# Patient Record
Sex: FEMALE | Race: BLACK OR AFRICAN AMERICAN | NOT HISPANIC OR LATINO | Employment: UNEMPLOYED | URBAN - METROPOLITAN AREA
[De-identification: names, ages, dates, MRNs, and addresses within clinical notes are randomized per-mention and may not be internally consistent; named-entity substitution may affect disease eponyms.]

---

## 2024-04-14 ENCOUNTER — HOSPITAL ENCOUNTER (OUTPATIENT)
Dept: RADIOLOGY | Facility: CLINIC | Age: 44
Discharge: HOME | End: 2024-04-14
Payer: COMMERCIAL

## 2024-04-14 DIAGNOSIS — E78.5 HYPERLIPIDEMIA: ICD-10-CM

## 2024-04-14 PROCEDURE — 75571 CT HRT W/O DYE W/CA TEST: CPT

## 2025-05-20 ENCOUNTER — APPOINTMENT (OUTPATIENT)
Dept: OBSTETRICS AND GYNECOLOGY | Facility: CLINIC | Age: 45
End: 2025-05-20
Payer: COMMERCIAL

## 2025-05-20 VITALS
DIASTOLIC BLOOD PRESSURE: 76 MMHG | HEIGHT: 71 IN | BODY MASS INDEX: 32.62 KG/M2 | WEIGHT: 233 LBS | SYSTOLIC BLOOD PRESSURE: 113 MMHG

## 2025-05-20 DIAGNOSIS — Z12.31 ENCOUNTER FOR SCREENING MAMMOGRAM FOR MALIGNANT NEOPLASM OF BREAST: Primary | ICD-10-CM

## 2025-05-20 DIAGNOSIS — F32.A DEPRESSION, UNSPECIFIED DEPRESSION TYPE: ICD-10-CM

## 2025-05-20 DIAGNOSIS — Z80.0 FAMILY HISTORY OF COLON CANCER: ICD-10-CM

## 2025-05-20 DIAGNOSIS — Z71.6 TOBACCO ABUSE COUNSELING: ICD-10-CM

## 2025-05-20 DIAGNOSIS — Z01.419 WELL WOMAN EXAM WITH ROUTINE GYNECOLOGICAL EXAM: ICD-10-CM

## 2025-05-20 DIAGNOSIS — N92.4 EXCESSIVE BLEEDING IN PREMENOPAUSAL PERIOD: ICD-10-CM

## 2025-05-20 DIAGNOSIS — Z12.4 CERVICAL CANCER SCREENING: ICD-10-CM

## 2025-05-20 DIAGNOSIS — Z20.2 POSSIBLE EXPOSURE TO STD: ICD-10-CM

## 2025-05-20 DIAGNOSIS — N76.0 BACTERIAL VAGINOSIS: ICD-10-CM

## 2025-05-20 DIAGNOSIS — B96.89 BACTERIAL VAGINOSIS: ICD-10-CM

## 2025-05-20 DIAGNOSIS — N89.8 VAGINAL DISCHARGE: ICD-10-CM

## 2025-05-20 DIAGNOSIS — Z86.19 HISTORY OF HERPES GENITALIS: ICD-10-CM

## 2025-05-20 PROCEDURE — 88175 CYTOPATH C/V AUTO FLUID REDO: CPT

## 2025-05-20 PROCEDURE — 1036F TOBACCO NON-USER: CPT | Performed by: ADVANCED PRACTICE MIDWIFE

## 2025-05-20 PROCEDURE — 87661 TRICHOMONAS VAGINALIS AMPLIF: CPT

## 2025-05-20 PROCEDURE — 99386 PREV VISIT NEW AGE 40-64: CPT | Performed by: ADVANCED PRACTICE MIDWIFE

## 2025-05-20 PROCEDURE — 87491 CHLMYD TRACH DNA AMP PROBE: CPT

## 2025-05-20 PROCEDURE — 87591 N.GONORRHOEAE DNA AMP PROB: CPT

## 2025-05-20 PROCEDURE — 3008F BODY MASS INDEX DOCD: CPT | Performed by: ADVANCED PRACTICE MIDWIFE

## 2025-05-20 PROCEDURE — 87626 HPV SEP HI-RSK TYP&POOL RSLT: CPT

## 2025-05-20 RX ORDER — FLUOXETINE 10 MG/1
10 CAPSULE ORAL DAILY
Qty: 30 CAPSULE | Refills: 11 | Status: SHIPPED | OUTPATIENT
Start: 2025-05-20 | End: 2026-05-20

## 2025-05-20 RX ORDER — VALACYCLOVIR HYDROCHLORIDE 1 G/1
1000 TABLET, FILM COATED ORAL 2 TIMES DAILY
Qty: 20 TABLET | Refills: 3 | Status: SHIPPED | OUTPATIENT
Start: 2025-05-20 | End: 2025-06-29

## 2025-05-20 SDOH — ECONOMIC STABILITY: FOOD INSECURITY: WITHIN THE PAST 12 MONTHS, THE FOOD YOU BOUGHT JUST DIDN'T LAST AND YOU DIDN'T HAVE MONEY TO GET MORE.: SOMETIMES TRUE

## 2025-05-20 SDOH — ECONOMIC STABILITY: FOOD INSECURITY: WITHIN THE PAST 12 MONTHS, YOU WORRIED THAT YOUR FOOD WOULD RUN OUT BEFORE YOU GOT MONEY TO BUY MORE.: SOMETIMES TRUE

## 2025-05-20 SDOH — ECONOMIC STABILITY: INCOME INSECURITY: IN THE LAST 12 MONTHS, WAS THERE A TIME WHEN YOU WERE NOT ABLE TO PAY THE MORTGAGE OR RENT ON TIME?: YES

## 2025-05-20 SDOH — ECONOMIC STABILITY: TRANSPORTATION INSECURITY
IN THE PAST 12 MONTHS, HAS LACK OF TRANSPORTATION KEPT YOU FROM MEETINGS, WORK, OR FROM GETTING THINGS NEEDED FOR DAILY LIVING?: NO

## 2025-05-20 SDOH — ECONOMIC STABILITY: TRANSPORTATION INSECURITY
IN THE PAST 12 MONTHS, HAS THE LACK OF TRANSPORTATION KEPT YOU FROM MEDICAL APPOINTMENTS OR FROM GETTING MEDICATIONS?: NO

## 2025-05-20 ASSESSMENT — COLUMBIA-SUICIDE SEVERITY RATING SCALE - C-SSRS
2. HAVE YOU ACTUALLY HAD ANY THOUGHTS OF KILLING YOURSELF?: NO
6. HAVE YOU EVER DONE ANYTHING, STARTED TO DO ANYTHING, OR PREPARED TO DO ANYTHING TO END YOUR LIFE?: NO
1. IN THE PAST MONTH, HAVE YOU WISHED YOU WERE DEAD OR WISHED YOU COULD GO TO SLEEP AND NOT WAKE UP?: NO

## 2025-05-20 ASSESSMENT — PATIENT HEALTH QUESTIONNAIRE - PHQ9
1. LITTLE INTEREST OR PLEASURE IN DOING THINGS: NOT AT ALL
SUM OF ALL RESPONSES TO PHQ9 QUESTIONS 1 AND 2: 0
2. FEELING DOWN, DEPRESSED OR HOPELESS: NOT AT ALL

## 2025-05-20 NOTE — PROGRESS NOTES
"Assessment/Plan   Problem List Items Addressed This Visit    None  Visit Diagnoses         Codes      Encounter for screening mammogram for malignant neoplasm of breast    -  Primary Z12.31    Relevant Orders    BI mammo bilateral screening tomosynthesis      Well woman exam with routine gynecological exam     Z01.419      Vaginal discharge     N89.8    Relevant Orders    Vaginitis Gram Stain For Bacterial Vaginosis + Yeast      Cervical cancer screening     Z12.4    Relevant Orders    THINPREP PAP TEST      Tobacco abuse counseling     Z71.6    Relevant Orders    THINPREP PAP TEST      Possible exposure to STD     Z20.2    Relevant Orders    Hepatitis B Surface Antigen    Hepatitis C Antibody    HIV 1/2 Antigen/Antibody Screen with Reflex to Confirmation    Syphilis Screen with Reflex      History of herpes genitalis     Z86.19    Relevant Medications    valACYclovir (Valtrex) 1 gram tablet            FERNY Stevenson     Catrachito Jain is a 44 y.o. female who is here for a routine exam.     Concerns today:  Vaginal discharge    Patient's last menstrual period was 05/07/2025 (exact date).   Periods are regular every 28-30 days, lasting 4 days.   Dysmenorrhea:mild, occurring first 1-2 days of flow.   Cyclic symptoms include moodiness.     Sexual Activity: not sexually active  Pain with intercourse? Not currently sexually active  Loss of desire? Not sexually active  Able to have an orgasm? Yes     History of prior STI: herpes    Current contraception: none    Last pap: 2018  History of abnormal Pap smear: no  Family history of uterine or ovarian cancer: no    Last mammogram: 2021  History of abnormal mammogram: no  Family history of breast cancer: no  Menstrual History:  OB History    No obstetric history on file.        Menarche age: 13  Patient's last menstrual period was 05/07/2025 (exact date).       Objective   /76   Ht 1.803 m (5' 11\")   Wt 106 kg (233 lb)   LMP 05/07/2025 " (Exact Date)   BMI 32.50 kg/m²     Colonoscopy: 2020, referral placed    Diet: real food    Exercise: walks daily  Physical Exam  Constitutional:       Appearance: Normal appearance.   Genitourinary:      Vulva and rectum normal.      Uterus is enlarged.   HENT:      Head: Normocephalic.      Nose: Nose normal.      Mouth/Throat:      Mouth: Mucous membranes are moist.   Eyes:      Pupils: Pupils are equal, round, and reactive to light.   Cardiovascular:      Rate and Rhythm: Normal rate.      Pulses: Normal pulses.   Pulmonary:      Effort: Pulmonary effort is normal.   Abdominal:      General: Abdomen is flat.      Palpations: Abdomen is soft.   Musculoskeletal:         General: Normal range of motion.      Cervical back: Normal range of motion and neck supple.   Neurological:      General: No focal deficit present.      Mental Status: She is alert and oriented to person, place, and time.   Skin:     General: Skin is warm.   Psychiatric:         Mood and Affect: Mood normal.         Behavior: Behavior normal. Behavior is cooperative.   Vitals reviewed.     Pap  Std testing per request  Declined birth control  Pelvic Usz, tsh for heavy bleeding and enlarged irregular uterus  Prozac 10 mg for depression and anxiety without Si/HI - referral to counseling placed  Mammogram ordered    Valtrex for herpes

## 2025-05-21 ENCOUNTER — TELEPHONE (OUTPATIENT)
Dept: OBSTETRICS AND GYNECOLOGY | Facility: CLINIC | Age: 45
End: 2025-05-21
Payer: COMMERCIAL

## 2025-05-21 LAB
BV SCORE VAG QL: ABNORMAL
C TRACH RRNA SPEC QL NAA+PROBE: NEGATIVE
N GONORRHOEA DNA SPEC QL PROBE+SIG AMP: NEGATIVE
T VAGINALIS RRNA SPEC QL NAA+PROBE: NEGATIVE
TSH SERPL-ACNC: 1.31 MIU/L

## 2025-05-21 RX ORDER — METRONIDAZOLE 500 MG/1
500 TABLET ORAL 2 TIMES DAILY
Qty: 14 TABLET | Refills: 0 | Status: SHIPPED | OUTPATIENT
Start: 2025-05-21 | End: 2025-05-28

## 2025-05-21 NOTE — TELEPHONE ENCOUNTER
Relayed message to patient in other telephone encounter. Patient verbalized understanding and agreement.

## 2025-05-21 NOTE — TELEPHONE ENCOUNTER
----- Message from Shaniqua Feldman sent at 5/21/2025 12:35 PM EDT -----  Please let Edith know that her TSH is normal.   Thanks  Shaniqua  ----- Message -----  From: Jarrett [facility] Results In  Sent: 5/21/2025   6:59 AM EDT  To: Shaniqua Feldman, FERNY

## 2025-05-21 NOTE — TELEPHONE ENCOUNTER
Call to patient. Reviewed results and recommendations. Patient is agreeable to treatment and states that she has already picked up the Flagyl from her pharmacy. She denies any questions/concerns at this time. Reviewed with patient that some tests are still in process and that we will call her with results when available.

## 2025-05-21 NOTE — TELEPHONE ENCOUNTER
----- Message from Shaniqua Feldman sent at 5/21/2025  4:51 PM EDT -----  Please call to discuss +BV. Flagyl sent to patient's pharmacy.    ----- Message -----  From: Chad [facility] Results In  Sent: 5/21/2025   6:59 AM EDT  To: FERNY Stevenson

## 2025-05-22 LAB
HBV SURFACE AG SERPL QL IA: NORMAL
HCV AB SERPL QL IA: NORMAL
HIV 1+2 AB+HIV1 P24 AG SERPL QL IA: NORMAL
T PALLIDUM AB SER QL IA: NORMAL

## 2025-05-27 ENCOUNTER — APPOINTMENT (OUTPATIENT)
Dept: RADIOLOGY | Facility: CLINIC | Age: 45
End: 2025-05-27
Payer: COMMERCIAL

## 2025-05-27 ENCOUNTER — HOSPITAL ENCOUNTER (OUTPATIENT)
Dept: RADIOLOGY | Facility: HOSPITAL | Age: 45
Discharge: HOME | End: 2025-05-27
Payer: COMMERCIAL

## 2025-05-27 VITALS — WEIGHT: 233.69 LBS | HEIGHT: 71 IN | BODY MASS INDEX: 32.72 KG/M2

## 2025-05-27 DIAGNOSIS — Z12.31 ENCOUNTER FOR SCREENING MAMMOGRAM FOR MALIGNANT NEOPLASM OF BREAST: ICD-10-CM

## 2025-05-27 DIAGNOSIS — N92.4 EXCESSIVE BLEEDING IN PREMENOPAUSAL PERIOD: ICD-10-CM

## 2025-05-27 PROCEDURE — 76856 US EXAM PELVIC COMPLETE: CPT | Performed by: RADIOLOGY

## 2025-05-27 PROCEDURE — 77067 SCR MAMMO BI INCL CAD: CPT | Performed by: RADIOLOGY

## 2025-05-27 PROCEDURE — 77063 BREAST TOMOSYNTHESIS BI: CPT | Performed by: RADIOLOGY

## 2025-05-27 PROCEDURE — 77067 SCR MAMMO BI INCL CAD: CPT

## 2025-05-27 PROCEDURE — 76830 TRANSVAGINAL US NON-OB: CPT

## 2025-05-27 PROCEDURE — 76830 TRANSVAGINAL US NON-OB: CPT | Performed by: RADIOLOGY

## 2025-05-28 ENCOUNTER — TELEMEDICINE (OUTPATIENT)
Dept: PRIMARY CARE | Facility: CLINIC | Age: 45
End: 2025-05-28
Payer: COMMERCIAL

## 2025-05-28 DIAGNOSIS — R05.1 ACUTE COUGH: ICD-10-CM

## 2025-05-28 DIAGNOSIS — B34.9 VIRAL SYNDROME: Primary | ICD-10-CM

## 2025-05-28 PROCEDURE — 99213 OFFICE O/P EST LOW 20 MIN: CPT

## 2025-05-28 RX ORDER — BENZONATATE 100 MG/1
100 CAPSULE ORAL 3 TIMES DAILY PRN
Qty: 42 CAPSULE | Refills: 0 | Status: SHIPPED | OUTPATIENT
Start: 2025-05-28 | End: 2025-06-27

## 2025-05-28 RX ORDER — GUAIFENESIN 600 MG/1
1200 TABLET, EXTENDED RELEASE ORAL 2 TIMES DAILY
Qty: 120 TABLET | Refills: 11 | Status: SHIPPED | OUTPATIENT
Start: 2025-05-28 | End: 2026-05-28

## 2025-05-28 RX ORDER — PSEUDOEPHEDRINE HCL 30 MG
30 TABLET ORAL EVERY 4 HOURS PRN
Qty: 30 TABLET | Refills: 0 | Status: SHIPPED | OUTPATIENT
Start: 2025-05-28 | End: 2025-06-07

## 2025-05-28 ASSESSMENT — ENCOUNTER SYMPTOMS
HEADACHES: 0
VOMITING: 0
RHINORRHEA: 1
COUGH: 1
DIARRHEA: 0
ABDOMINAL PAIN: 0
SORE THROAT: 0
SINUS PAIN: 1
NAUSEA: 0

## 2025-05-28 NOTE — PATIENT INSTRUCTIONS
follow-up with your PCP in 2-3 days, sooner if not  improving.    Follow-up immediately if symptoms worsen. If experiencing symptoms including but not limited to lethargy / chest pain / weakness / dizziness / difficulty breathing please call 911 or go to the emergency department for immediate care.     Drinking plenty of fluids and getting lots of rest. Chicken soup and hot beverages may help.    Trial of nasal irrigation with a Nettipot or squeeze bottle with sterile salt water.    Nasal spray corticosteroids (Flonase) may help in reducing the inflammatory response in the nasal passages and airways. Please try 2 sprays each nostril daily for 2 weeks.  If you have season allergies, please take a daily antihistamine of your choice, such as Zyrtec/Claritin/Allegra at bedtime.    Take Tylenol or Motrin/Aleve for sinus or ear pain.    If you have good blood pressure you can try pseudofed (you must ask the pharmacist for it and show ID).    Please call or return to the office if you are not feeling better in the next 3-4 days after starting treatment.    Over-the-counter cold and cough medications     Take Mucinex for cough, drink plenty of fluids with this medication and will help break up congestion making it easier to cough up     For cough can use honey (children ages 1 and up) in hot tea or hot water. I recommend putting this in an insulated cup and carrying it around throughout the day to sip on.  Have it at your bedside at night in case you wake up coughing.  You can also use honey cough drops (adults and older children).     Recommend nasal saline for use in children and adults.  Neti Morris can also be helpful.  (Never used tap water and a Neti pot.  Use distilled water.)     If you have plugged up congested ears or the feeling of fluid in your ears, you can use an over-the-counter nasal steroid spray like fluticasone (brand name Flonase) use 2 sprays into each nostril once or twice a day for 7 days.  This will  help open up the eustachian tubes and allow the fluid to drain out of your ears.     Sleeping with your head/chest elevated can help with sinus drainage.     Adults only-can use nasal decongestant (like Afrin) at bedtime to open nasal passages so you can breathe through your nose while you sleep; avoid using for longer than 3 days as this medication can become addicting.  Do not use if you have high blood pressure or high heart rate.     For severe pain or fever in adult-Tylenol (2 extra strength) or ibuprofen (3-4 tabs equals 600 to 800 mg) alternating as needed for pain.  Tylenol doses should be 6 to 8 hours apart and ibuprofen doses should be 6 to 8 hours apart.        Common cold medications for adults explained:     Mucinex-(generic name guaifenesin)-is an expectorant.  This will thin out all the thick mucus.  Must drink plenty of liquids for this medicine to work.     Dextromethorphan (brand name Delsym or DM)-this medicine is a cough suppressant     Honey in hot water or tea-this is a natural cough suppressant     Decongestant nasal spray- (eg: Afrin) use for temporary relief of nasal congestion-best when used at bedtime to open up nasal passages so that you are not forced to mouth breathe overnight.     Sudafed (generic name pseudoephedrine-this must be bought from the pharmacist) DO NOT use this medicine if you have high blood pressure as it can raise your blood pressure higher.  Do not use if you have any irregular heart rate.  This medicine can help clear congestion in your sinuses.    URIs usually improves within 2 weeks, but at times the cough will persist for three or four weeks beyond that. If you are experiencing any shortness of breath, developing wheezing, chest pain, or getting worse rather than better after the above measures, and call the office for further evaluation.

## 2025-05-28 NOTE — PROGRESS NOTES
On Demand Virtual Visit Patient Consent     This visit was completed via video conference. All issues as below were discussed and addressed but no physical exam was performed. If it was felt that the patient should be evaluated in clinic than they were directed there. The patient verbally consented to the visit.    An interactive audio and video telecommunication system which permits real time communications between the patient (at the originating site) and provider (at the distant site) was utilized to provide this telehealth service.   Verbal consent was requested and obtained from Edith Jain (or parent if under 18) 05/28/25 for a telehealth visit.   I have verbally confirmed with Edith Jain (or parent if under 18) that they are physically located in the [facility] during this virtual visit.    Subjective   Patient ID: Edith Jain is a 44 y.o. female who presents for No chief complaint on file..    URI   This is a new problem. Episode onset: 2days ago. The problem has been gradually improving. Maximum temperature: not measured. The fever has been present for Less than 1 day. Associated symptoms include congestion, coughing, joint pain, rhinorrhea and sinus pain. Pertinent negatives include no abdominal pain, diarrhea, ear pain, headaches, nausea, plugged ear sensation, sneezing, sore throat or vomiting. Treatments tried: guaifenasen.        Review of Systems   HENT:  Positive for congestion, rhinorrhea and sinus pain. Negative for ear pain, sneezing and sore throat.    Respiratory:  Positive for cough.    Gastrointestinal:  Negative for abdominal pain, diarrhea, nausea and vomiting.   Musculoskeletal:  Positive for joint pain.   Neurological:  Negative for headaches.       Objective   LMP 05/07/2025 (Exact Date)     Physical Exam  Constitutional:       General: She is not in acute distress.     Appearance: Normal appearance. She is ill-appearing.      Comments: Pt was lying in bed during entire visit      HENT:      Nose: Congestion present.   Pulmonary:      Effort: Pulmonary effort is normal.   Neurological:      Mental Status: She is alert and oriented to person, place, and time.     Discussed options and precautions:   Viral versus bacterial infection; use of medications; possible side effects; appropriate over-the-counter medications; possible complications and /or when to follow-up.     Follow-up in 1 to 2 days if not improving.  Follow-up immediately if symptoms worsen.     All red flags requiring in person care were discussed.  All patient's questions were answered.        Assessment/Plan   Edith was seen today for uri.  Diagnoses and all orders for this visit:  Viral syndrome  -     pseudoephedrine (Sudafed) 30 mg tablet; Take 1 tablet (30 mg) by mouth every 4 hours if needed for congestion for up to 10 days.  -     benzonatate (Tessalon) 100 mg capsule; Take 1 capsule (100 mg) by mouth 3 times a day as needed for cough. Do not crush or chew.  -     guaiFENesin (Mucinex) 600 mg 12 hr tablet; Take 2 tablets (1,200 mg) by mouth 2 times a day. Do not crush, chew, or split.  Acute cough  -     pseudoephedrine (Sudafed) 30 mg tablet; Take 1 tablet (30 mg) by mouth every 4 hours if needed for congestion for up to 10 days.  -     benzonatate (Tessalon) 100 mg capsule; Take 1 capsule (100 mg) by mouth 3 times a day as needed for cough. Do not crush or chew.  -     guaiFENesin (Mucinex) 600 mg 12 hr tablet; Take 2 tablets (1,200 mg) by mouth 2 times a day. Do not crush, chew, or split.

## 2025-05-29 ENCOUNTER — TELEPHONE (OUTPATIENT)
Dept: OBSTETRICS AND GYNECOLOGY | Facility: CLINIC | Age: 45
End: 2025-05-29

## 2025-05-29 ENCOUNTER — APPOINTMENT (OUTPATIENT)
Dept: GASTROENTEROLOGY | Facility: CLINIC | Age: 45
End: 2025-05-29
Payer: COMMERCIAL

## 2025-05-29 VITALS
BODY MASS INDEX: 33.01 KG/M2 | HEART RATE: 84 BPM | HEIGHT: 71 IN | WEIGHT: 235.8 LBS | DIASTOLIC BLOOD PRESSURE: 78 MMHG | SYSTOLIC BLOOD PRESSURE: 108 MMHG

## 2025-05-29 DIAGNOSIS — Z80.0 FAMILY HISTORY OF COLON CANCER: ICD-10-CM

## 2025-05-29 DIAGNOSIS — Z12.11 COLON CANCER SCREENING: ICD-10-CM

## 2025-05-29 DIAGNOSIS — Z12.11 COLON CANCER SCREENING: Primary | ICD-10-CM

## 2025-05-29 PROCEDURE — 3008F BODY MASS INDEX DOCD: CPT | Performed by: INTERNAL MEDICINE

## 2025-05-29 PROCEDURE — 99204 OFFICE O/P NEW MOD 45 MIN: CPT | Performed by: INTERNAL MEDICINE

## 2025-05-29 PROCEDURE — 1036F TOBACCO NON-USER: CPT | Performed by: INTERNAL MEDICINE

## 2025-05-29 RX ORDER — SODIUM, POTASSIUM,MAG SULFATES 17.5-3.13G
SOLUTION, RECONSTITUTED, ORAL ORAL
Qty: 1 EACH | Refills: 0 | Status: SHIPPED | OUTPATIENT
Start: 2025-05-29

## 2025-05-29 NOTE — TELEPHONE ENCOUNTER
Call to patient. Relayed results and recommendations. Patient is agreeable to scheduling with MD. Patient scheduled for Tuesday 6/3/25 with Dr. Chandler.

## 2025-05-29 NOTE — PROGRESS NOTES
"Primary Care Provider: Rowena Galindo MD  Referring Provider: Shaniqua Feldman*  My final recommendations will be communicated back to the referring physician and/or primary care provider via shared medical records or via US mail.    Chief Complaint (Reason for visit):   Edith Jain is a 44 y.o. female who presents for colorectal cancer screening    ASSESSMENT AND PLAN     Assessment & Plan  Colon cancer screening  Patient's mother has been diagnosed with colon cancer at an early age  Last colonoscopy was at the age of 39-I am unable to locate records    - I recommended repeat colonoscopy  Orders:    Colonoscopy Screening; High Risk Patient; Future    Family history of colon cancer    Orders:    Referral to Gastroenterology    Colonoscopy Screening; High Risk Patient; Future      I discussed the risks, benefits and alternative(s) of the procedure(s) with the patient. Pt verbalizes understanding and is willing to proceed. All questions were answered.    Jonathan Canada MD, MS    SUBJECTIVE   HPI: History obtained from patient    44-year-old -American female who comes to see me to discuss colorectal cancer screening    Patient's mother has been diagnosed with colon cancer at an early age  Last colonoscopy was at the age of 39-I am unable to locate records    Pt denies lower abdominal pain, constipation, diarrhea, blood in stool    REDFLAGS  Pt denies any blood in stool, black stools  Pt denies unintentional weight loss, fever, chills, night sweats    Medical History[1]    Surgical History[2]    Current Medications[3]    Allergies[4]  OBJECTIVE   PHYSICAL EXAM:  Pulse 84   Ht 1.803 m (5' 11\")   Wt 107 kg (235 lb 12.8 oz)   LMP 05/07/2025 (Exact Date)   BMI 32.89 kg/m²      LABS/IMAGING/SCOPES  Lab Results   Component Value Date    WBC 5.6 03/23/2020    HGB 13.5 03/23/2020    HCT 39.2 03/23/2020    MCV 91 03/23/2020     03/23/2020     Lab Results   Component Value Date    GLUCOSE 85 03/23/2020 "    CALCIUM 9.0 03/23/2020     (L) 03/23/2020    K 3.8 03/23/2020    CO2 27 03/23/2020     03/23/2020    BUN 6 03/23/2020    CREATININE 0.78 03/23/2020     Lab Results   Component Value Date    ALT 16 03/23/2020    AST 14 03/23/2020    ALKPHOS 48 03/23/2020    BILITOT 0.5 03/23/2020       === 04/14/24 ===    CT CARDIAC SCORING WO IV CONTRAST    Addendum 4/14/2024  9:32 PM ------------------------------------------------  Interpreted By:  Crhistiano Cooley,  ADDENDUM:  Technical: The following is to serve as an over-read for an  unenhanced cardiac CT, to evaluate the extra vascular structures.    Contiguous unenhanced CT sections are performed from level the alan  to the upper abdomen.        Findings: A 3-4 mm densities identified in the right lower lobe which  could be related to pulmonary vessel and is stable from 12/18/2019.  The visualized portions of both lungs are otherwise clear.    There is no sign of pathologic lymph node enlargement. There is no  pericardial or pleural effusion.    Images through the upper abdomen are unremarkable.    The visualized osseous and soft tissue structures of the chest wall  are intact.        Impression: The extra vascular structures have an unremarkable CT  appearance.    Signed by: Christiano Cooley 4/14/2024 9:32 PM    -------- ORIGINAL REPORT --------  Dictation workstation:   [id number]    - Impression -  1. Coronary artery calcium score of  0*.    *Coronary artery calcium scoring may be helpful in predicting the  risk for future coronary heart disease events.  According to the  [employer] Foundation Clinical Expert Consensus  Task Force, such testing provides important prognostic information in  patients with more than one coronary heart disease risk factor. The  coronary artery calcium score correlates with the annual risk of a  non-fatal myocardial infarction or coronary heart disease death.    Coronary artery score             "Annual Risk    0-99                               0.4%  100-399                          1.3%  >400                              2.4%    These three \"breakpoints\" correspond to lower, intermediate and high  risk states for future coronary events.  Such information should be  used, along with appropriate clinical judgment, to make decisions  regarding the intensity of risk factor management strategies to treat  blood lipids and to modify other non-lipid coronary risk factors.    Reference: Macomb P et al. Circulation.  2007; 115:402-426    Reading Cardiologist: Dr. Christiano Hastings, Date: 2024 4:05 pm    Signed by: Christiano Hastings 2024 4:06 PM  Dictation workstation:   [id number]    Jonathan Canada MD, MS         [1]   Past Medical History:  Diagnosis Date    Encounter for gynecological examination (general) (routine) without abnormal findings 05/15/2018    Well woman exam    Herpes     Personal history of other complications of pregnancy, childbirth and the puerperium 12/15/2019    History of spontaneous     Spina bifida occulta 2017    Spina bifida occulta   [2]   Past Surgical History:  Procedure Laterality Date    DILATION AND CURETTAGE OF UTERUS      OTHER SURGICAL HISTORY  2020    Surgically induced     OTHER SURGICAL HISTORY      vocal cord polyp removal   [3]   Current Outpatient Medications   Medication Sig Dispense Refill    benzonatate (Tessalon) 100 mg capsule Take 1 capsule (100 mg) by mouth 3 times a day as needed for cough. Do not crush or chew. 42 capsule 0    FLUoxetine (PROzac) 10 mg capsule Take 1 capsule (10 mg) by mouth once daily. 30 capsule 11    guaiFENesin (Mucinex) 600 mg 12 hr tablet Take 2 tablets (1,200 mg) by mouth 2 times a day. Do not crush, chew, or split. 120 tablet 11    metroNIDAZOLE (Flagyl) 500 mg tablet Take 1 tablet (500 mg) by mouth 2 times a day for 7 days. 14 tablet 0    pseudoephedrine (Sudafed) 30 mg tablet Take 1 tablet (30 mg) " by mouth every 4 hours if needed for congestion for up to 10 days. 30 tablet 0    valACYclovir (Valtrex) 1 gram tablet Take 1 tablet (1,000 mg) by mouth 2 times a day. 20 tablet 3     No current facility-administered medications for this visit.   [4] No Known Allergies

## 2025-05-29 NOTE — LETTER
May 29, 2025     FERNY Stevenson  [address]   [facility], [address]  [facility] [address]    Patient: Edith Jain   YOB: 1980   Date of Visit: 5/29/2025       Dear FERNY Borrero:    Thank you for referring Edith Jain to me for evaluation. Below are my notes for this consultation.  If you have questions, please do not hesitate to call me. I look forward to following your patient along with you.       Sincerely,     Jonathan Canada MD, MS      CC: Rowena Galindo MD  ______________________________________________________________________________________    Primary Care Provider: Rowena Galindo MD  Referring Provider: Shaniqua Feldman*  My final recommendations will be communicated back to the referring physician and/or primary care provider via shared medical records or via US mail.    Chief Complaint (Reason for visit):   Edith Jain is a 44 y.o. female who presents for colorectal cancer screening    ASSESSMENT AND PLAN     Assessment & Plan  Colon cancer screening  Patient's mother has been diagnosed with colon cancer at an early age  Last colonoscopy was at the age of 39-I am unable to locate records    - I recommended repeat colonoscopy  Orders:  •  Colonoscopy Screening; High Risk Patient; Future    Family history of colon cancer    Orders:  •  Referral to Gastroenterology  •  Colonoscopy Screening; High Risk Patient; Future      I discussed the risks, benefits and alternative(s) of the procedure(s) with the patient. Pt verbalizes understanding and is willing to proceed. All questions were answered.    Jonathan Canada MD, MS    SUBJECTIVE   HPI: History obtained from patient    44-year-old -American female who comes to see me to discuss colorectal cancer screening    Patient's mother has been diagnosed with colon cancer at an early age  Last colonoscopy was at the age of 39-I am unable to locate records    Pt denies lower  "abdominal pain, constipation, diarrhea, blood in stool    REDFLAGS  Pt denies any blood in stool, black stools  Pt denies unintentional weight loss, fever, chills, night sweats    Medical History[1]    Surgical History[2]    Current Medications[3]    Allergies[4]  OBJECTIVE   PHYSICAL EXAM:  Pulse 84   Ht 1.803 m (5' 11\")   Wt 107 kg (235 lb 12.8 oz)   LMP 05/07/2025 (Exact Date)   BMI 32.89 kg/m²      LABS/IMAGING/SCOPES  Lab Results   Component Value Date    WBC 5.6 03/23/2020    HGB 13.5 03/23/2020    HCT 39.2 03/23/2020    MCV 91 03/23/2020     03/23/2020     Lab Results   Component Value Date    GLUCOSE 85 03/23/2020    CALCIUM 9.0 03/23/2020     (L) 03/23/2020    K 3.8 03/23/2020    CO2 27 03/23/2020     03/23/2020    BUN 6 03/23/2020    CREATININE 0.78 03/23/2020     Lab Results   Component Value Date    ALT 16 03/23/2020    AST 14 03/23/2020    ALKPHOS 48 03/23/2020    BILITOT 0.5 03/23/2020       === 04/14/24 ===    CT CARDIAC SCORING WO IV CONTRAST    Addendum 4/14/2024  9:32 PM ------------------------------------------------  Interpreted By:  Christiano Cooley,  ADDENDUM:  Technical: The following is to serve as an over-read for an  unenhanced cardiac CT, to evaluate the extra vascular structures.    Contiguous unenhanced CT sections are performed from level the alan  to the upper abdomen.        Findings: A 3-4 mm densities identified in the right lower lobe which  could be related to pulmonary vessel and is stable from 12/18/2019.  The visualized portions of both lungs are otherwise clear.    There is no sign of pathologic lymph node enlargement. There is no  pericardial or pleural effusion.    Images through the upper abdomen are unremarkable.    The visualized osseous and soft tissue structures of the chest wall  are intact.        Impression: The extra vascular structures have an unremarkable CT  appearance.    Signed by: Christiano Cooley 4/14/2024 9:32 PM    -------- " "ORIGINAL REPORT --------  Dictation workstation:   [id number]    - Impression -  1. Coronary artery calcium score of  0*.    *Coronary artery calcium scoring may be helpful in predicting the  risk for future coronary heart disease events.  According to the  American College of [employer] Foundation Clinical Expert Consensus  Task Force, such testing provides important prognostic information in  patients with more than one coronary heart disease risk factor. The  coronary artery calcium score correlates with the annual risk of a  non-fatal myocardial infarction or coronary heart disease death.    Coronary artery score            Annual Risk    0-99                               0.4%  100-399                          1.3%  >400                              2.4%    These three \"breakpoints\" correspond to lower, intermediate and high  risk states for future coronary events.  Such information should be  used, along with appropriate clinical judgment, to make decisions  regarding the intensity of risk factor management strategies to treat  blood lipids and to modify other non-lipid coronary risk factors.    Reference: [place] P et al. Circulation.  2007; 115:402-426    Reading Cardiologist: Dr. Christiano Hastings, Date: 2024 4:05 pm    Signed by: Christiano Hastings 2024 4:06 PM  Dictation workstation:   [id number]    Jonathan Canada MD, MS         [1]  Past Medical History:  Diagnosis Date   • Encounter for gynecological examination (general) (routine) without abnormal findings 05/15/2018    Well woman exam   • Herpes    • Personal history of other complications of pregnancy, childbirth and the puerperium 12/15/2019    History of spontaneous    • Spina bifida occulta 2017    Spina bifida occulta   [2]  Past Surgical History:  Procedure Laterality Date   • DILATION AND CURETTAGE OF UTERUS     • OTHER SURGICAL HISTORY  2020    Surgically induced    • OTHER SURGICAL HISTORY      vocal " cord polyp removal   [3]  Current Outpatient Medications   Medication Sig Dispense Refill   • benzonatate (Tessalon) 100 mg capsule Take 1 capsule (100 mg) by mouth 3 times a day as needed for cough. Do not crush or chew. 42 capsule 0   • FLUoxetine (PROzac) 10 mg capsule Take 1 capsule (10 mg) by mouth once daily. 30 capsule 11   • guaiFENesin (Mucinex) 600 mg 12 hr tablet Take 2 tablets (1,200 mg) by mouth 2 times a day. Do not crush, chew, or split. 120 tablet 11   • metroNIDAZOLE (Flagyl) 500 mg tablet Take 1 tablet (500 mg) by mouth 2 times a day for 7 days. 14 tablet 0   • pseudoephedrine (Sudafed) 30 mg tablet Take 1 tablet (30 mg) by mouth every 4 hours if needed for congestion for up to 10 days. 30 tablet 0   • valACYclovir (Valtrex) 1 gram tablet Take 1 tablet (1,000 mg) by mouth 2 times a day. 20 tablet 3     No current facility-administered medications for this visit.   [4]  No Known Allergies       [1]  Past Medical History:  Diagnosis Date   • Encounter for gynecological examination (general) (routine) without abnormal findings 05/15/2018    Well woman exam   • Herpes    • Personal history of other complications of pregnancy, childbirth and the puerperium 12/15/2019    History of spontaneous    • Spina bifida occulta 2017    Spina bifida occulta   [2]  Past Surgical History:  Procedure Laterality Date   • DILATION AND CURETTAGE OF UTERUS     • OTHER SURGICAL HISTORY  2020    Surgically induced    • OTHER SURGICAL HISTORY      vocal cord polyp removal   [3]  Current Outpatient Medications   Medication Sig Dispense Refill   • benzonatate (Tessalon) 100 mg capsule Take 1 capsule (100 mg) by mouth 3 times a day as needed for cough. Do not crush or chew. 42 capsule 0   • FLUoxetine (PROzac) 10 mg capsule Take 1 capsule (10 mg) by mouth once daily. 30 capsule 11   • guaiFENesin (Mucinex) 600 mg 12 hr tablet Take 2 tablets (1,200 mg) by mouth 2 times a day. Do not crush, chew,  or split. 120 tablet 11   • metroNIDAZOLE (Flagyl) 500 mg tablet Take 1 tablet (500 mg) by mouth 2 times a day for 7 days. 14 tablet 0   • pseudoephedrine (Sudafed) 30 mg tablet Take 1 tablet (30 mg) by mouth every 4 hours if needed for congestion for up to 10 days. 30 tablet 0   • valACYclovir (Valtrex) 1 gram tablet Take 1 tablet (1,000 mg) by mouth 2 times a day. 20 tablet 3     No current facility-administered medications for this visit.   [4]  No Known Allergies

## 2025-05-29 NOTE — TELEPHONE ENCOUNTER
----- Message from Shaniqua Feldman sent at 5/29/2025  5:51 AM EDT -----  Regarding: FW:  Please have Edith schedule a follow up with an MD to review her US.   Thank you.   Shaniqua  ----- Message -----  From: Interface, Radiology Results In  Sent: 5/28/2025   5:35 PM EDT  To: Shaniqua Feldman, ROBER-NAIMA

## 2025-06-03 ENCOUNTER — APPOINTMENT (OUTPATIENT)
Dept: OBSTETRICS AND GYNECOLOGY | Facility: CLINIC | Age: 45
End: 2025-06-03
Payer: COMMERCIAL

## 2025-06-03 VITALS — WEIGHT: 230 LBS | SYSTOLIC BLOOD PRESSURE: 144 MMHG | BODY MASS INDEX: 32.08 KG/M2 | DIASTOLIC BLOOD PRESSURE: 88 MMHG

## 2025-06-03 DIAGNOSIS — D25.9 UTERINE LEIOMYOMA, UNSPECIFIED LOCATION: Primary | ICD-10-CM

## 2025-06-03 LAB
CYTOLOGY CMNT CVX/VAG CYTO-IMP: NORMAL
HPV HR 12 DNA GENITAL QL NAA+PROBE: NEGATIVE
HPV HR GENOTYPES PNL CVX NAA+PROBE: NEGATIVE
HPV16 DNA SPEC QL NAA+PROBE: NEGATIVE
HPV18 DNA SPEC QL NAA+PROBE: NEGATIVE
LAB AP HPV GENOTYPE QUESTION: YES
LAB AP HPV HR: NORMAL
LAB AP PAP ADDITIONAL TESTS: NORMAL
LABORATORY COMMENT REPORT: NORMAL
LMP START DATE: NORMAL
PATH REPORT.TOTAL CANCER: NORMAL

## 2025-06-03 PROCEDURE — 1036F TOBACCO NON-USER: CPT | Performed by: STUDENT IN AN ORGANIZED HEALTH CARE EDUCATION/TRAINING PROGRAM

## 2025-06-03 PROCEDURE — 99214 OFFICE O/P EST MOD 30 MIN: CPT | Performed by: STUDENT IN AN ORGANIZED HEALTH CARE EDUCATION/TRAINING PROGRAM

## 2025-06-03 NOTE — PROGRESS NOTES
Subjective   Patient ID: Edith Jain is a 44 y.o. female who presents for Est patient visit (US Results).  Referred by primary CNM to discuss US findings. Had annual visit reporting regular monthly menses with 4 days of bleeding. She had noticed over the years her bleeding had become heavier with 2 days of having to use heaftier pads, but denies any alarm sympts or soaking through clothes. She had an US which demonstrated multifibroid 10cm uterus. She denies any significant bulk symptoms. Otherwise does not think her periods or her uterus has caused her much trouble.         Review of Systems   All other systems reviewed and are negative.      Objective   Physical Exam  Vitals reviewed.   Constitutional:       Appearance: Normal appearance.   HENT:      Head: Normocephalic and atraumatic.      Mouth/Throat:      Mouth: Mucous membranes are moist.   Eyes:      Extraocular Movements: Extraocular movements intact.      Conjunctiva/sclera: Conjunctivae normal.      Pupils: Pupils are equal, round, and reactive to light.   Cardiovascular:      Rate and Rhythm: Normal rate.   Pulmonary:      Effort: Pulmonary effort is normal.   Abdominal:      Palpations: Abdomen is soft.   Musculoskeletal:         General: Normal range of motion.      Cervical back: Normal range of motion and neck supple.   Skin:     General: Skin is warm and dry.   Neurological:      General: No focal deficit present.      Mental Status: She is alert.   Psychiatric:         Mood and Affect: Mood normal.         Behavior: Behavior normal.         Thought Content: Thought content normal.         Judgment: Judgment normal.         Assessment/Plan   Problem List Items Addressed This Visit    None  Visit Diagnoses         Codes      Uterine leiomyoma, unspecified location    -  Primary D25.9          Discuss imaging findings. Provided precautions for changing menstrual pattern, would recommend EMB in that circumstance. Patient understanding. RTC if needed.         Sharmila Chandler MD 06/03/25 2:52 PM

## 2025-06-17 ENCOUNTER — APPOINTMENT (OUTPATIENT)
Dept: PRIMARY CARE | Facility: CLINIC | Age: 45
End: 2025-06-17
Payer: COMMERCIAL

## 2025-06-17 VITALS
SYSTOLIC BLOOD PRESSURE: 124 MMHG | BODY MASS INDEX: 31.78 KG/M2 | HEART RATE: 91 BPM | OXYGEN SATURATION: 98 % | TEMPERATURE: 97.3 F | WEIGHT: 227 LBS | DIASTOLIC BLOOD PRESSURE: 82 MMHG | HEIGHT: 71 IN

## 2025-06-17 DIAGNOSIS — Z00.00 ROUTINE GENERAL MEDICAL EXAMINATION AT A HEALTH CARE FACILITY: Primary | ICD-10-CM

## 2025-06-17 DIAGNOSIS — N76.0 ACUTE VAGINITIS: ICD-10-CM

## 2025-06-17 DIAGNOSIS — Z13.220 LIPID SCREENING: ICD-10-CM

## 2025-06-17 DIAGNOSIS — R73.9 HYPERGLYCEMIA: ICD-10-CM

## 2025-06-17 DIAGNOSIS — Z13.0 SCREENING, ANEMIA, DEFICIENCY, IRON: ICD-10-CM

## 2025-06-17 DIAGNOSIS — Z13.1 SCREENING FOR DIABETES MELLITUS: ICD-10-CM

## 2025-06-17 PROBLEM — E55.9 VITAMIN D DEFICIENCY: Status: ACTIVE | Noted: 2025-06-17

## 2025-06-17 PROBLEM — E66.811 OBESITY, CLASS I, BMI 30-34.9: Status: ACTIVE | Noted: 2025-06-17

## 2025-06-17 PROCEDURE — 3008F BODY MASS INDEX DOCD: CPT | Performed by: FAMILY MEDICINE

## 2025-06-17 PROCEDURE — 1036F TOBACCO NON-USER: CPT | Performed by: FAMILY MEDICINE

## 2025-06-17 PROCEDURE — 99396 PREV VISIT EST AGE 40-64: CPT | Performed by: FAMILY MEDICINE

## 2025-06-17 RX ORDER — FLUCONAZOLE 150 MG/1
150 TABLET ORAL ONCE
Qty: 1 TABLET | Refills: 0 | Status: SHIPPED | OUTPATIENT
Start: 2025-06-17 | End: 2025-06-17

## 2025-06-17 ASSESSMENT — ENCOUNTER SYMPTOMS
DIZZINESS: 0
TROUBLE SWALLOWING: 0
ACTIVITY CHANGE: 0
BLOOD IN STOOL: 0
VOMITING: 0
DIARRHEA: 0
CHILLS: 0
DYSURIA: 0
WEAKNESS: 0
FEVER: 0
NAUSEA: 0
CONSTIPATION: 0
APPETITE CHANGE: 0
ARTHRALGIAS: 0
SHORTNESS OF BREATH: 0
HEADACHES: 0
HEMATURIA: 0
ABDOMINAL PAIN: 0
NUMBNESS: 0
SLEEP DISTURBANCE: 0
FREQUENCY: 0
LIGHT-HEADEDNESS: 0

## 2025-06-17 ASSESSMENT — PATIENT HEALTH QUESTIONNAIRE - PHQ9
SUM OF ALL RESPONSES TO PHQ QUESTIONS 1-9: 11
7. TROUBLE CONCENTRATING ON THINGS, SUCH AS READING THE NEWSPAPER OR WATCHING TELEVISION: NOT AT ALL
4. FEELING TIRED OR HAVING LITTLE ENERGY: MORE THAN HALF THE DAYS
9. THOUGHTS THAT YOU WOULD BE BETTER OFF DEAD, OR OF HURTING YOURSELF: NOT AT ALL
1. LITTLE INTEREST OR PLEASURE IN DOING THINGS: MORE THAN HALF THE DAYS
5. POOR APPETITE OR OVEREATING: MORE THAN HALF THE DAYS
2. FEELING DOWN, DEPRESSED OR HOPELESS: MORE THAN HALF THE DAYS
8. MOVING OR SPEAKING SO SLOWLY THAT OTHER PEOPLE COULD HAVE NOTICED. OR THE OPPOSITE, BEING SO FIGETY OR RESTLESS THAT YOU HAVE BEEN MOVING AROUND A LOT MORE THAN USUAL: NOT AT ALL
3. TROUBLE FALLING OR STAYING ASLEEP OR SLEEPING TOO MUCH: MORE THAN HALF THE DAYS
SUM OF ALL RESPONSES TO PHQ9 QUESTIONS 1 AND 2: 4
6. FEELING BAD ABOUT YOURSELF - OR THAT YOU ARE A FAILURE OR HAVE LET YOURSELF OR YOUR FAMILY DOWN: SEVERAL DAYS

## 2025-06-17 NOTE — PATIENT INSTRUCTIONS
[facility]     Address: [address], [place] [address]  Phone: [phone]

## 2025-06-17 NOTE — PROGRESS NOTES
"Subjective   Patient ID: Edith Jain is a 44 y.o. female who presents for New Patient Visit and Annual Exam.    HPI   Here for annual wellness exam. Last wellness last yr     New concerns:yes vaginal itching. Was on metronidazole for bv and since completing the course, she has been having vaginal itching   Feels: well    Changes  to health/medications since last visit No     Periods: regular  Contraception: no       Health screenings:  Pap smear: neg May 2025                                   Mammogram yes May 2025                                   Self-breast Exam Yes                                   Colon screening yes already scheduled                                   Dexa not applicable                                  Hep C screening Yes  negative                                   HIV screening no                                  STI screeningyes just had it done and was neg             Review of Systems   Constitutional:  Negative for activity change, appetite change, chills and fever.   HENT:  Negative for hearing loss, tinnitus and trouble swallowing.    Eyes:  Negative for visual disturbance.   Respiratory:  Negative for shortness of breath.    Cardiovascular:  Negative for chest pain.   Gastrointestinal:  Negative for abdominal pain, blood in stool, constipation, diarrhea, nausea and vomiting.   Genitourinary:  Negative for dysuria, frequency and hematuria.   Musculoskeletal:  Negative for arthralgias.   Skin:  Negative for rash.   Neurological:  Negative for dizziness, weakness, light-headedness, numbness and headaches.   Psychiatric/Behavioral:  Negative for sleep disturbance.        Objective   /82   Pulse 91   Temp 36.3 °C (97.3 °F)   Ht 1.803 m (5' 11\")   Wt 103 kg (227 lb)   LMP 05/27/2025 (Exact Date)   SpO2 98%   BMI 31.66 kg/m²     Physical Exam  Constitutional:       Appearance: Normal appearance.   HENT:      Head: Normocephalic and atraumatic.      Right Ear: Tympanic membrane " normal.      Left Ear: Tympanic membrane normal.      Nose: Nose normal.      Mouth/Throat:      Mouth: Mucous membranes are moist.   Eyes:      Pupils: Pupils are equal, round, and reactive to light.   Neck:      Thyroid: No thyromegaly.   Cardiovascular:      Rate and Rhythm: Normal rate and regular rhythm.   Pulmonary:      Effort: Pulmonary effort is normal.      Breath sounds: Normal breath sounds.   Abdominal:      General: Abdomen is flat. Bowel sounds are normal. There is no distension.      Palpations: Abdomen is soft.      Tenderness: There is no guarding or rebound.   Musculoskeletal:         General: No swelling or deformity. Normal range of motion.      Cervical back: Normal range of motion.   Skin:     General: Skin is warm.   Neurological:      General: No focal deficit present.      Mental Status: She is alert.   Psychiatric:         Mood and Affect: Mood normal.       Assessment/Plan   Assessment & Plan  Routine general medical examination at a health care facility  Recommendations for women annual wellness exam:  Make sure screenings for cervical, breast, and colon cancer are up to date if applicable- pap smears age 21-65, discuss mammogram starting at age 40, colonoscopy at age 45, earlier if positive family history for breast or colon cancer  Screen for osteoporosis with DXA bone scan starting at age 65 or sooner if risk factors present (long term steroid use, smoking, heavy alcohol use, history of fracture, rheumatoid arthritis, low body weight, family history of hip fracture)  Screening for lung cancer with low-dose CT in all adults age 55-77 who have a 30 pack-year smoking history and currently smoke or have quit within the past 15 years  Follow a healthy diet (Dash diet, Mediterranean diet)  Exercise 150 min/wk  Maintain healthy weight (BMI < 25)  Do not smoke  Alcohol in moderation (up to 1 drink/day)  Get enough sleep (7-8 hours/night)  Make sure immunizations are up to date (influenza,  pneumococcal, Tdap, shingles if age > 50)  Postmenopausal women or women with osteoporosis need minimum 1,200 mg calcium and 800 IU vitamin D daily  Talk to your physician if you have concerns about depression or anxiety  Visit dentist twice yearly   Orders:    Follow Up In Primary Care - Health Maintenance; Future    Lipid screening    Orders:    Lipid Panel; Future    Screening, anemia, deficiency, iron    Orders:    CBC and Auto Differential; Future    Screening for diabetes mellitus    Orders:    Comprehensive Metabolic Panel; Future    Hyperglycemia    Orders:    Hemoglobin A1C; Future    Acute vaginitis  Pt will come in sooner if not better   Orders:    fluconazole (Diflucan) 150 mg tablet; Take 1 tablet (150 mg) by mouth 1 time for 1 dose.    1 yr CPE

## 2025-06-18 LAB
ALBUMIN SERPL-MCNC: 4 G/DL (ref 3.6–5.1)
ALP SERPL-CCNC: 50 U/L (ref 31–125)
ALT SERPL-CCNC: 11 U/L (ref 6–29)
ANION GAP SERPL CALCULATED.4IONS-SCNC: 6 MMOL/L (CALC) (ref 7–17)
AST SERPL-CCNC: 12 U/L (ref 10–35)
BASOPHILS # BLD AUTO: 47 CELLS/UL (ref 0–200)
BASOPHILS NFR BLD AUTO: 0.7 %
BILIRUB SERPL-MCNC: 0.4 MG/DL (ref 0.2–1.2)
BUN SERPL-MCNC: 16 MG/DL (ref 7–25)
CALCIUM SERPL-MCNC: 9.3 MG/DL (ref 8.6–10.2)
CHLORIDE SERPL-SCNC: 105 MMOL/L (ref 98–110)
CHOLEST SERPL-MCNC: 185 MG/DL
CHOLEST/HDLC SERPL: 4.4 (CALC)
CO2 SERPL-SCNC: 25 MMOL/L (ref 20–32)
CREAT SERPL-MCNC: 0.82 MG/DL (ref 0.5–0.99)
EGFRCR SERPLBLD CKD-EPI 2021: 90 ML/MIN/1.73M2
EOSINOPHIL # BLD AUTO: 60 CELLS/UL (ref 15–500)
EOSINOPHIL NFR BLD AUTO: 0.9 %
ERYTHROCYTE [DISTWIDTH] IN BLOOD BY AUTOMATED COUNT: 13.1 % (ref 11–15)
EST. AVERAGE GLUCOSE BLD GHB EST-MCNC: 103 MG/DL
EST. AVERAGE GLUCOSE BLD GHB EST-SCNC: 5.7 MMOL/L
GLUCOSE SERPL-MCNC: 107 MG/DL (ref 65–139)
HBA1C MFR BLD: 5.2 %
HCT VFR BLD AUTO: 38.7 % (ref 35–45)
HDLC SERPL-MCNC: 42 MG/DL
HGB BLD-MCNC: 12.2 G/DL (ref 11.7–15.5)
LDLC SERPL CALC-MCNC: 127 MG/DL (CALC)
LYMPHOCYTES # BLD AUTO: 2492 CELLS/UL (ref 850–3900)
LYMPHOCYTES NFR BLD AUTO: 37.2 %
MCH RBC QN AUTO: 30.1 PG (ref 27–33)
MCHC RBC AUTO-ENTMCNC: 31.5 G/DL (ref 32–36)
MCV RBC AUTO: 95.6 FL (ref 80–100)
MONOCYTES # BLD AUTO: 442 CELLS/UL (ref 200–950)
MONOCYTES NFR BLD AUTO: 6.6 %
NEUTROPHILS # BLD AUTO: 3658 CELLS/UL (ref 1500–7800)
NEUTROPHILS NFR BLD AUTO: 54.6 %
NONHDLC SERPL-MCNC: 143 MG/DL (CALC)
PLATELET # BLD AUTO: 310 THOUSAND/UL (ref 140–400)
PMV BLD REES-ECKER: 9.1 FL (ref 7.5–12.5)
POTASSIUM SERPL-SCNC: 4.3 MMOL/L (ref 3.5–5.3)
PROT SERPL-MCNC: 6.9 G/DL (ref 6.1–8.1)
RBC # BLD AUTO: 4.05 MILLION/UL (ref 3.8–5.1)
SODIUM SERPL-SCNC: 136 MMOL/L (ref 135–146)
TRIGL SERPL-MCNC: 65 MG/DL
WBC # BLD AUTO: 6.7 THOUSAND/UL (ref 3.8–10.8)

## 2025-08-14 ENCOUNTER — APPOINTMENT (OUTPATIENT)
Dept: GASTROENTEROLOGY | Facility: EXTERNAL LOCATION | Age: 45
End: 2025-08-14
Payer: COMMERCIAL

## 2025-10-06 ENCOUNTER — APPOINTMENT (OUTPATIENT)
Dept: PRIMARY CARE | Facility: CLINIC | Age: 45
End: 2025-10-06
Payer: COMMERCIAL

## 2025-11-17 ENCOUNTER — APPOINTMENT (OUTPATIENT)
Dept: GASTROENTEROLOGY | Facility: EXTERNAL LOCATION | Age: 45
End: 2025-11-17
Payer: COMMERCIAL

## 2026-06-19 ENCOUNTER — APPOINTMENT (OUTPATIENT)
Dept: PRIMARY CARE | Facility: CLINIC | Age: 46
End: 2026-06-19
Payer: COMMERCIAL